# Patient Record
Sex: FEMALE | Race: WHITE | NOT HISPANIC OR LATINO | Employment: FULL TIME | URBAN - METROPOLITAN AREA
[De-identification: names, ages, dates, MRNs, and addresses within clinical notes are randomized per-mention and may not be internally consistent; named-entity substitution may affect disease eponyms.]

---

## 2017-10-11 ENCOUNTER — HISTORICAL (OUTPATIENT)
Dept: ADMINISTRATIVE | Facility: HOSPITAL | Age: 37
End: 2017-10-11

## 2017-10-13 LAB — FINAL CULTURE: NO GROWTH

## 2017-10-19 ENCOUNTER — HISTORICAL (OUTPATIENT)
Dept: ADMINISTRATIVE | Facility: HOSPITAL | Age: 37
End: 2017-10-19

## 2018-01-02 ENCOUNTER — HISTORICAL (OUTPATIENT)
Dept: ADMINISTRATIVE | Facility: HOSPITAL | Age: 38
End: 2018-01-02

## 2018-02-20 ENCOUNTER — HISTORICAL (OUTPATIENT)
Dept: ADMINISTRATIVE | Facility: HOSPITAL | Age: 38
End: 2018-02-20

## 2018-03-01 ENCOUNTER — HISTORICAL (OUTPATIENT)
Dept: ADMINISTRATIVE | Facility: HOSPITAL | Age: 38
End: 2018-03-01

## 2018-03-27 ENCOUNTER — HISTORICAL (OUTPATIENT)
Dept: LAB | Facility: HOSPITAL | Age: 38
End: 2018-03-27

## 2018-03-29 LAB — FINAL CULTURE: NORMAL

## 2018-04-10 ENCOUNTER — HISTORICAL (OUTPATIENT)
Dept: ADMINISTRATIVE | Facility: HOSPITAL | Age: 38
End: 2018-04-10

## 2018-08-22 ENCOUNTER — HISTORICAL (OUTPATIENT)
Dept: ADMINISTRATIVE | Facility: HOSPITAL | Age: 38
End: 2018-08-22

## 2018-08-22 LAB — TSH SERPL-ACNC: 0.01 MIU/ML (ref 0.35–4.94)

## 2018-10-15 ENCOUNTER — HISTORICAL (OUTPATIENT)
Dept: ADMINISTRATIVE | Facility: HOSPITAL | Age: 38
End: 2018-10-15

## 2018-10-15 LAB — TSH SERPL-ACNC: 1.12 MIU/ML (ref 0.35–4.94)

## 2019-10-23 ENCOUNTER — HISTORICAL (OUTPATIENT)
Dept: ADMINISTRATIVE | Facility: HOSPITAL | Age: 39
End: 2019-10-23

## 2019-10-23 LAB
ABS NEUT (OLG): 8 X10(3)/MCL (ref 2.1–9.2)
ALBUMIN SERPL-MCNC: 4.5 GM/DL (ref 3.4–5)
ALBUMIN/GLOB SERPL: 1.61 {RATIO} (ref 1.5–2.5)
ALP SERPL-CCNC: 101 UNIT/L (ref 38–126)
ALT SERPL-CCNC: 10 UNIT/L (ref 7–52)
AST SERPL-CCNC: 12 UNIT/L (ref 15–37)
BILIRUB SERPL-MCNC: 0.6 MG/DL (ref 0.2–1)
BILIRUBIN DIRECT+TOT PNL SERPL-MCNC: 0.1 MG/DL (ref 0–0.5)
BILIRUBIN DIRECT+TOT PNL SERPL-MCNC: 0.5 MG/DL
BUN SERPL-MCNC: 15 MG/DL (ref 7–18)
CALCIUM SERPL-MCNC: 9.6 MG/DL (ref 8.5–10)
CHLORIDE SERPL-SCNC: 103 MMOL/L (ref 98–107)
CHOLEST SERPL-MCNC: 210 MG/DL (ref 0–200)
CHOLEST/HDLC SERPL: 4.4 {RATIO}
CO2 SERPL-SCNC: 28 MMOL/L (ref 21–32)
CREAT SERPL-MCNC: 0.77 MG/DL (ref 0.6–1.3)
ERYTHROCYTE [DISTWIDTH] IN BLOOD BY AUTOMATED COUNT: 11.5 % (ref 11.5–17)
GLOBULIN SER-MCNC: 2.8 GM/DL (ref 1.2–3)
GLUCOSE SERPL-MCNC: 112 MG/DL (ref 74–106)
HCT VFR BLD AUTO: 42.5 % (ref 37–47)
HDLC SERPL-MCNC: 48 MG/DL (ref 35–60)
HGB BLD-MCNC: 14.4 GM/DL (ref 12–16)
LDLC SERPL CALC-MCNC: 135 MG/DL (ref 0–129)
LYMPHOCYTES # BLD AUTO: 2.4 X10(3)/MCL (ref 0.6–3.4)
LYMPHOCYTES NFR BLD AUTO: 22.5 % (ref 13–40)
MCH RBC QN AUTO: 30.1 PG (ref 27–31.2)
MCHC RBC AUTO-ENTMCNC: 34 GM/DL (ref 32–36)
MCV RBC AUTO: 89 FL (ref 80–94)
MONOCYTES # BLD AUTO: 0.4 X10(3)/MCL (ref 0.1–1.3)
MONOCYTES NFR BLD AUTO: 4 % (ref 0.1–24)
NEUTROPHILS NFR BLD AUTO: 73.5 % (ref 47–80)
PLATELET # BLD AUTO: 322 X10(3)/MCL (ref 130–400)
PMV BLD AUTO: 10.2 FL (ref 9.4–12.4)
POTASSIUM SERPL-SCNC: 4.7 MMOL/L (ref 3.5–5.1)
PROT SERPL-MCNC: 7.3 GM/DL (ref 6.4–8.2)
RBC # BLD AUTO: 4.78 X10(6)/MCL (ref 4.2–5.4)
SODIUM SERPL-SCNC: 137 MMOL/L (ref 136–145)
T3FREE SERPL-MCNC: 2 PG/ML (ref 1.45–3.48)
T4 FREE SERPL-MCNC: 0.96 NG/DL (ref 0.76–1.46)
TRIGL SERPL-MCNC: 167 MG/DL (ref 30–150)
TSH SERPL-ACNC: 3.51 MIU/ML (ref 0.35–4.94)
VLDLC SERPL CALC-MCNC: 33.4 MG/DL
WBC # SPEC AUTO: 10.8 X10(3)/MCL (ref 4.5–11.5)

## 2020-10-27 ENCOUNTER — HISTORICAL (OUTPATIENT)
Dept: ADMINISTRATIVE | Facility: HOSPITAL | Age: 40
End: 2020-10-27

## 2020-10-27 LAB
ABS NEUT (OLG): 7.5 X10(3)/MCL (ref 2.1–9.2)
ALBUMIN SERPL-MCNC: 4.3 GM/DL (ref 3.4–5)
ALBUMIN/GLOB SERPL: 1.95 {RATIO} (ref 1.5–2.5)
ALP SERPL-CCNC: 106 UNIT/L (ref 38–126)
ALT SERPL-CCNC: 9 UNIT/L (ref 7–52)
AST SERPL-CCNC: 12 UNIT/L (ref 15–37)
BILIRUB SERPL-MCNC: 0.6 MG/DL (ref 0.2–1)
BILIRUBIN DIRECT+TOT PNL SERPL-MCNC: 0.1 MG/DL (ref 0–0.5)
BILIRUBIN DIRECT+TOT PNL SERPL-MCNC: 0.5 MG/DL
BUN SERPL-MCNC: 15 MG/DL (ref 7–18)
CALCIUM SERPL-MCNC: 9.5 MG/DL (ref 8.5–10)
CHLORIDE SERPL-SCNC: 102 MMOL/L (ref 98–107)
CHOLEST SERPL-MCNC: 165 MG/DL (ref 0–200)
CHOLEST/HDLC SERPL: 4.1 {RATIO}
CO2 SERPL-SCNC: 28 MMOL/L (ref 21–32)
CREAT SERPL-MCNC: 0.85 MG/DL (ref 0.6–1.3)
ERYTHROCYTE [DISTWIDTH] IN BLOOD BY AUTOMATED COUNT: 11.8 % (ref 11.5–17)
GLOBULIN SER-MCNC: 2.2 GM/DL (ref 1.2–3)
GLUCOSE SERPL-MCNC: 95 MG/DL (ref 74–106)
HCT VFR BLD AUTO: 43.7 % (ref 37–47)
HDLC SERPL-MCNC: 40 MG/DL (ref 35–60)
HGB BLD-MCNC: 14.5 GM/DL (ref 12–16)
LDLC SERPL CALC-MCNC: 112 MG/DL (ref 0–129)
LYMPHOCYTES # BLD AUTO: 2.4 X10(3)/MCL (ref 0.6–3.4)
LYMPHOCYTES NFR BLD AUTO: 22.8 % (ref 13–40)
MCH RBC QN AUTO: 29.6 PG (ref 27–31.2)
MCHC RBC AUTO-ENTMCNC: 33 GM/DL (ref 32–36)
MCV RBC AUTO: 89 FL (ref 80–94)
MONOCYTES # BLD AUTO: 0.6 X10(3)/MCL (ref 0.1–1.3)
MONOCYTES NFR BLD AUTO: 5.9 % (ref 0.1–24)
NEUTROPHILS NFR BLD AUTO: 71.3 % (ref 47–80)
PLATELET # BLD AUTO: 283 X10(3)/MCL (ref 130–400)
PMV BLD AUTO: 10.7 FL (ref 9.4–12.4)
POTASSIUM SERPL-SCNC: 4.4 MMOL/L (ref 3.5–5.1)
PROT SERPL-MCNC: 6.5 GM/DL (ref 6.4–8.2)
RBC # BLD AUTO: 4.9 X10(6)/MCL (ref 4.2–5.4)
SODIUM SERPL-SCNC: 136 MMOL/L (ref 136–145)
T3FREE SERPL-MCNC: 2.89 PG/ML (ref 1.45–3.48)
T4 FREE SERPL-MCNC: 0.94 NG/DL (ref 0.76–1.46)
TRIGL SERPL-MCNC: 266 MG/DL (ref 30–150)
TSH SERPL-ACNC: 6.3 MIU/ML (ref 0.35–4.94)
VLDLC SERPL CALC-MCNC: 53.2 MG/DL
WBC # SPEC AUTO: 10.5 X10(3)/MCL (ref 4.5–11.5)

## 2021-11-29 ENCOUNTER — HISTORICAL (OUTPATIENT)
Dept: ADMINISTRATIVE | Facility: HOSPITAL | Age: 41
End: 2021-11-29

## 2021-11-29 LAB
ABS NEUT (OLG): 5.3 X10(3)/MCL (ref 2.1–9.2)
ALBUMIN SERPL-MCNC: 4.4 GM/DL (ref 3.4–5)
ALBUMIN/GLOB SERPL: 1.76 {RATIO} (ref 1.5–2.5)
ALP SERPL-CCNC: 95 UNIT/L (ref 38–126)
ALT SERPL-CCNC: 14 UNIT/L (ref 7–52)
APPEARANCE, UA: CLEAR
AST SERPL-CCNC: 15 UNIT/L (ref 15–37)
BACTERIA #/AREA URNS AUTO: NORMAL /HPF
BILIRUB SERPL-MCNC: 0.5 MG/DL (ref 0.2–1)
BILIRUB UR QL STRIP: NEGATIVE MG/DL
BILIRUBIN DIRECT+TOT PNL SERPL-MCNC: 0.1 MG/DL (ref 0–0.5)
BILIRUBIN DIRECT+TOT PNL SERPL-MCNC: 0.4 MG/DL
BUN SERPL-MCNC: 14 MG/DL (ref 7–18)
CALCIUM SERPL-MCNC: 9.4 MG/DL (ref 8.5–10)
CHLORIDE SERPL-SCNC: 104 MMOL/L (ref 98–107)
CHOLEST SERPL-MCNC: 188 MG/DL (ref 0–200)
CHOLEST/HDLC SERPL: 4.6 {RATIO}
CO2 SERPL-SCNC: 28 MMOL/L (ref 21–32)
COLOR UR: YELLOW
CREAT SERPL-MCNC: 0.78 MG/DL (ref 0.6–1.3)
ERYTHROCYTE [DISTWIDTH] IN BLOOD BY AUTOMATED COUNT: 11.6 % (ref 11.5–17)
GLOBULIN SER-MCNC: 2.5 GM/DL (ref 1.2–3)
GLUCOSE (UA): NEGATIVE MG/DL
GLUCOSE SERPL-MCNC: 106 MG/DL (ref 74–106)
HCT VFR BLD AUTO: 42.2 % (ref 37–47)
HDLC SERPL-MCNC: 41 MG/DL (ref 35–60)
HGB BLD-MCNC: 13.5 GM/DL (ref 12–16)
HGB UR QL STRIP: NEGATIVE UNIT/L
KETONES UR QL STRIP: NEGATIVE MG/DL
LDLC SERPL CALC-MCNC: 117 MG/DL (ref 0–129)
LEUKOCYTE ESTERASE UR QL STRIP: NEGATIVE UNIT/L
LYMPHOCYTES # BLD AUTO: 2.5 X10(3)/MCL (ref 0.6–3.4)
LYMPHOCYTES NFR BLD AUTO: 30.6 % (ref 13–40)
MCH RBC QN AUTO: 28.4 PG (ref 27–31.2)
MCHC RBC AUTO-ENTMCNC: 32 GM/DL (ref 32–36)
MCV RBC AUTO: 89 FL (ref 80–94)
MONOCYTES # BLD AUTO: 0.4 X10(3)/MCL (ref 0.1–1.3)
MONOCYTES NFR BLD AUTO: 5.1 % (ref 0.1–24)
NEUTROPHILS NFR BLD AUTO: 64.3 % (ref 47–80)
NITRITE UR QL STRIP.AUTO: NEGATIVE
PH UR STRIP: 7 [PH]
PLATELET # BLD AUTO: 249 X10(3)/MCL (ref 130–400)
PMV BLD AUTO: 11 FL (ref 9.4–12.4)
POTASSIUM SERPL-SCNC: 4.2 MMOL/L (ref 3.5–5.1)
PROT SERPL-MCNC: 6.9 GM/DL (ref 6.4–8.2)
PROT UR QL STRIP: NEGATIVE MG/DL
RBC # BLD AUTO: 4.76 X10(6)/MCL (ref 4.2–5.4)
RBC #/AREA URNS HPF: NORMAL /HPF
SODIUM SERPL-SCNC: 140 MMOL/L (ref 136–145)
SP GR UR STRIP: 1.02
SQUAMOUS EPITHELIAL, UA: NORMAL /LPF
T3FREE SERPL-MCNC: 3.02 PG/ML (ref 1.45–3.48)
T4 FREE SERPL-MCNC: 0.97 NG/DL (ref 0.76–1.46)
TRIGL SERPL-MCNC: 224 MG/DL (ref 30–150)
TSH SERPL-ACNC: 6.22 MIU/ML (ref 0.35–4.94)
UROBILINOGEN UR STRIP-ACNC: 0.2 MG/DL
VLDLC SERPL CALC-MCNC: 44.8 MG/DL
WBC # SPEC AUTO: 8.2 X10(3)/MCL (ref 4.5–11.5)
WBC #/AREA URNS AUTO: NORMAL /[HPF]

## 2021-12-01 ENCOUNTER — HISTORICAL (OUTPATIENT)
Dept: ADMINISTRATIVE | Facility: HOSPITAL | Age: 41
End: 2021-12-01

## 2021-12-01 LAB
EST. AVERAGE GLUCOSE BLD GHB EST-MCNC: 108 MG/DL
HBA1C MFR BLD: 5.4 % (ref 4.4–6.4)

## 2022-04-09 ENCOUNTER — HISTORICAL (OUTPATIENT)
Dept: ADMINISTRATIVE | Facility: HOSPITAL | Age: 42
End: 2022-04-09

## 2022-04-26 VITALS
DIASTOLIC BLOOD PRESSURE: 78 MMHG | OXYGEN SATURATION: 100 % | SYSTOLIC BLOOD PRESSURE: 130 MMHG | WEIGHT: 170 LBS | BODY MASS INDEX: 28.32 KG/M2 | HEIGHT: 65 IN
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
## 2022-05-02 NOTE — HISTORICAL [facility]
This is a historical note converted from Kim. Formatting and pictures may have been removed.  Please reference Kim for original formatting and attached multimedia. Chief Complaint  WELLNESS CPX FAST  History of Present Illness  Patient presents today for a wellness exam.? She also has a history of hypothyroidism, she is seen Dr. Simmons endocrinology in the past?but is following up with us at this time.  No labs in the last year with Dr. Simmons or here.  Patient states she has been taking the levothyroxine 88 mcg approximately every other day, because she feels the daily dosing?gives her symptoms of hyperthyroidism.? She would not be opposed to lowering the dose?based on laboratory analysis.  Review of Systems  Constitutional:?No fever, no weakness, no weight loss, no fatigue  Eye: ? ? ? ? ? ? ? ???No eye redness, drainage, or pain  ENMT:??? ? ? ? ? ? No sore?throat pain, postnasal drainage, ?or mucus production  Respiratory:????No wheezing,?no shortness of breath  Cardiovascular:??No chest pain, no TILLMAN  Gastrointestinal:?No nausea, vomiting, or diarrhea. No abdominal pain, no hematochezia or melena  Musculoskeletal:?No muscle or joint pain, no joint swelling  Integumentary:??? No skin rash or abnormal lesion  Neuro:??No headaches, dizziness, or weakness  Psych:?No anxiety, depression, or SI/HI  Endo:??No polyuria, polydipsia, or polyphagia  Heme/Lymph:??No bruising or lymphadenopathy  Physical Exam  Vitals & Measurements  T:?37? ?C (Oral)? HR:?60(Peripheral)? BP:?104/68?  HT:?165?cm? WT:?75.2?kg? BMI:?27.62? LMP:?10/21/2019 00:00 CDT?  General: ???? ? ? ? ? Well developed, well-nourished, in no acute distress  Eye: ? ? ? ? ? ? ? ? ? ??Clear conjunctiva, eyelids normal  HENT:??? ? ? ? ? ? ? ? No erythema, No exudate or swelling, TMs clear  Neck:??? ? ? ? ? ? ? ? ?Full range of motion, No cervical lymphadenopathy  Respiratory:??? ? ?Clear to auscultation bilaterally  Cardiovascular:?Regular rate and rhythm without  murmurs, gallops or rubs  Abdomen: ? ? ? ? ?Soft, NT, ND, NABS x4, no HSM  Musculoskeletal:?No tenderness, joints WNL, FROM, ?no CCE  Neuro: ? ? ? ? ? ? ? ? ?No motor/sensory deficits, Reflexes 2+ throughout, CN II-XII intact  Integumentary: ??No rashes or skin lesions present  LN:?WNL  Assessment/Plan  1.?Wellness examination?Z00.00  ?Normal physical exam  Ordered:  CBC w/ Auto Diff, Routine collect, 10/23/19 10:18:00 CDT, Blood, Order for future visit, Stop date 10/23/19 10:18:00 CDT, Lab Collect, Wellness examination, 10/23/19 10:18:00 CDT  Comprehensive Metabolic Panel, Routine collect, 10/23/19 10:18:00 CDT, Blood, Order for future visit, Stop date 10/23/19 10:18:00 CDT, Lab Collect, Wellness examination, 10/23/19 10:18:00 CDT  Lipid Panel, Now collect, 10/23/19 10:18:00 CDT, Blood, Order for future visit, Stop date 10/23/19 10:18:00 CDT, Lab Collect, Wellness examination, 10/23/19 10:18:00 CDT  ?  2.?Hashimotos thyroiditis?E06.3  Patient has been taking 88 mcg of levothyroxine, approximately every other day.? I adjusted based on how I feel if I am running hyper?  We discussed that although?sometimes alternating days?or using?different dosings?is commonly required?with hypothyroidism it is best if we can find?a dose that she can take daily for convenience sake, and accuracy?during laboratory analysis.? She agrees?and pending those results?we have discussed possibly decreasing to 50 mcg.  Ordered:  Free T4, Routine collect, 10/23/19 10:18:00 CDT, Blood, Order for future visit, Stop date 10/23/19 10:18:00 CDT, Lab Collect, Hashimotos thyroiditis, 10/23/19 10:18:00 CDT  Free T4, Routine collect, *Est. 12/04/19 3:00:00 CST, Blood, Order for future visit, *Est. Stop date 12/04/19 3:00:00 CST, Lab Collect, Hashimotos thyroiditis, 10/23/19 10:32:00 CDT  T3 Free, Routine collect, 10/23/19 10:18:00 CDT, Blood, Order for future visit, Stop date 10/23/19 10:18:00 CDT, Lab Collect, Hashimotos thyroiditis, 10/23/19 10:18:00  CDT  T3 Free, Routine collect, *Est. 12/04/19 3:00:00 CST, Blood, Order for future visit, *Est. Stop date 12/04/19 3:00:00 CST, Lab Collect, Hashimotos thyroiditis, 10/23/19 10:32:00 CDT  Thyroid Stimulating Hormone, Routine collect, 10/23/19 10:18:00 CDT, Blood, Order for future visit, Stop date 10/23/19 10:18:00 CDT, Lab Collect, Hashimotos thyroiditis, 10/23/19 10:18:00 CDT  Thyroid Stimulating Hormone, Routine collect, *Est. 12/04/19 3:00:00 CST, Blood, Order for future visit, *Est. Stop date 12/04/19 3:00:00 CST, Lab Collect, Hashimotos thyroiditis, 10/23/19 10:32:00 CDT  ?  Orders:  Lab Collection Request, 10/23/19 10:18:00 CDT, [facility] - [facility], 10/23/19 10:18:00 CDT   Problem List/Past Medical History  Ongoing  History of palpitations  Pregnancy induced hypertension  Psoriasis  Historical  Hypothyroidism  Pregnant  Pregnant  Procedure/Surgical History  Delivery of Products of Conception, External Approach (04/30/2018)  Episiotomy (05/20/2015)  Medical induction of labor (05/20/2015)  Other manually assisted delivery (05/20/2015)  Extraction of impacted wisdom tooth  T&A   Medications  Calcium, Magnesium and Zinc oral tablet, 1 tab(s), Oral, Daily  levothyroxine 88 mcg (0.088 mg) oral tablet, 88 mcg= 1 tab(s), Oral, Daily  Zantac 150, 150 mg, Oral, BID,? ?Not taking  Allergies  sulfa drugs  Social History  Abuse/Neglect  No, 10/23/2019  Alcohol - Denies Alcohol Use, 05/20/2015  Current, 1-2 times per month, 07/23/2018  Employment/School  Employed, 07/23/2018  Home/Environment  Lives with Children, Spouse., 07/23/2018  Nutrition/Health  Regular, 07/23/2018  Substance Use - Denies Substance Abuse, 05/20/2015  Never, 04/30/2018  Tobacco - Denies Tobacco Use, 05/20/2015  Never (less than 100 in lifetime), N/A, 10/23/2019  Family History  Congestive heart disease.: Mother.  Diabetes mellitus type 2: Mother, Father and Grandmother.  Hypertension.: Father, Grandfather and Grandmother.  Primary malignant neoplasm of  prostate: Father.  Immunizations  Vaccine Date Status   influenza virus vaccine, inactivated 10/02/2019 Recorded   tetanus/diphtheria/pertussis, acel(Tdap) 05/01/2018 Given   influenza virus vaccine, inactivated 11/29/2017 Recorded   tetanus/diphtheria/pertussis, acel(Tdap) 05/21/2015 Given   hepatitis B pediatric vaccine 01/23/2002 Recorded   hepatitis B pediatric vaccine 09/17/2001 Recorded   meningococcal polysaccharide vaccine 08/27/2001 Recorded   hepatitis B pediatric vaccine 08/20/2001 Recorded   Health Maintenance  Health Maintenance  ???Pending?(in the next year)  ??? ??OverDue  ??? ? ? ?Alcohol Misuse Screening due??01/01/19??and every 1??year(s)  ??? ??Due?  ??? ? ? ?ADL Screening due??10/23/19??and every 1??year(s)  ??? ??Due In Future?  ??? ? ? ?Obesity Screening not due until??01/01/20??and every 1??year(s)  ???Satisfied?(in the past 1 year)  ??? ??Satisfied?  ??? ? ? ?Obesity Screening on??10/23/19.??Satisfied by Amanda Artis LPN  ?

## 2022-05-02 NOTE — HISTORICAL [facility]
This is a historical note converted from Kim. Formatting and pictures may have been removed.  Please reference Kim for original formatting and attached multimedia. Chief Complaint  Wellness visit  History of Present Illness  41 yo CF presents here today for a well adult exam. Pt. denies significant weight loss or weight gain, headache, hearing loss or vision changes, SOB or chest pain.  ?   Pt. has a hx. of Hashimotos. ?She notes she has a history of extreme?hyper thyroid episodes?as well as?extreme lows. ?Patient has been taking 25 mcg daily. ?She is prescribed 50 mcg, but notes that?when she would start the 50 mcg that she will have heart palpitations. ?She knows that her thyroid is?off?because she is having?on?symptoms like right?burning?foot pain/neuropathy,?spotting?after her?cycle has ended, and?excessive fatigue. ?She has never been on a beta-blocker?prior. ?Patient is without any other?complaints or concerns?today.  ?   PMhx:?Palpitations, psoriasis, hypothyroidism  General health:?Good  Diet:?Regular  Exercise:?Nothing routine  ?  Caffeine: one coffee or coke a day  Etoh:?1-2 times a month  Tobacco:?Never?  ?  Specialists: GYN, Endo-Maria R, Derm--Varun  ?  Labs:?Reviewed with patient today:?Liver and kidney function normal, cholesterol ratio 4.6.? Triglycerides have improved from 2 66-2 44.? LDL about the same as last year--117 today.? TSH is elevated, but free T4 and free T3 are normal.? No anemia.  Immunizations: UTD?with Tdap; refuses Covid and flu?immunizations  Colon Ca Screening: Colonoscopy due at 47  Breast Ca Screening: MMG due at 42; no family hx.  Cervical Ca Screening:?Pap smear up-to-date this year  Bone Density: due at later date  LMP:?11/23/2021; spotting  Dental/Vision:?Both up-to-date  Review of Systems  Constitutional:?No weight gain, No fever, No chills,?+ fatigue.?  Eyes:?No blurring, No visual disturbances.?  Ear/Nose/Mouth/Throat:?No decreased hearing, No ear pain, No nasal  congestion, No sore throat.?  Respiratory:?No shortness of breath, No cough, No wheezing.?  Cardiovascular:?No chest pain,?+ occ.?palpitations, No peripheral edema.?  Gastrointestinal:?No nausea, No vomiting, No diarrhea, No constipation, No abdominal pain.??No black or bloody stools.  Genitourinary:?No dysuria, No hematuria.?+urinates?a lot--weak bladder + vaginal spotting  Hematology/Lymphatics:?No bruising tendency, No bleeding tendency, No swollen lymph glands.?  Endocrine:?No excessive thirst,? No excessive hunger.?  Musculoskeletal:?No decreased range of motion.?  Integumentary:?No pruritus.??+?Psoriasis  Neurologic:?No abnormal balance, No confusion, No headache.?  Psychiatric:?No anxiety, No depression, Not suicidal, No hallucinations. ?  Physical Exam  Vitals & Measurements  HR:?80(Peripheral)? BP:?130/78? SpO2:?100%?  HT:?165?cm? WT:?77.1?kg? WT:?77.100?kg? LMP:?11/23/2021 00:00 CST?  General:?Alert and oriented, No acute distress.?  Eye:?Normal conjunctiva without exudate.  HENMT:?Normocephalic/AT, Normal hearing, Oral mucosa is moist and pink. ?Good dentition.??No nasal exudate.  Neck:?No goiter visualized.?  Respiratory:?Lungs CTAB, Respirations are non-labored, Breath sounds are equal, Symmetrical chest wall expansion.  Cardiovascular:?Normal rate, Regular rhythm, No murmur, No edema.?  Gastrointestinal:?Non-distended.??Soft, nontender to palpation. ?No mass or hernia.  Genitourinary:?No CVA tenderness.  Musculoskeletal:?Normal ROM, Normal gait, No deformities or amputations.  Integumentary:?Warm, Dry, Intact. No diaphoresis, or flushing.? + Psoriatic scarring?on?foot  Neurologic:?No focal deficits, Cranial Nerves II-XII are grossly intact.?  Psychiatric:?Cooperative, Appropriate mood & affect, Normal judgment, Non-suicidal.  Assessment/Plan  1.?Wellness examination?Z00.00  Diet discussed (healthy food choices, reduce portions and overall calorie intake)  Exercise 30-45 minutes 3x per week  Avoid  excessive alcohol and tobacco  Immunizations discussed?  Monthly self breast exams discussed  Preventative exams discussed  Labs reviewed in depth with pt.  added A1C to blood work since patient had IFG  Ordered:  Clinic Follow-Up Wellness, *Est. 12/01/22 3:00:00 CST, Order for future visit, Wellness examination, ink [facility]  Preventative Health Care Est 18-39 years [id number] PC, Wellness examination, INK AMB - [facility], 12/01/21 14:12:00 CST  ?  2.?Hypothyroidism?E03.9  pt. has been taking 25 mcg  increase to 50 mcg  Rx. propranolol  repeat in free T4 and TSH in 4-6 weeks  Super B complex due to neuropathy in right foot  +/- pelvic US for AUB  ?  3.?Psoriasis?L40.9  ?keep follow ups with derm.  ?  Education and counseling done face to face regarding medical conditions, current and new medications including risk/benefit and side effects/adverse events, over the counter medications-uses/doses, home self-care and red flags and indications for immediate medical attention. Call if symptoms change or new ones develop. Should any symptoms ever significantly worsen, go to ER. Follow up as scheduled in 6 mos. and yearly for wellness or sooner PRN. Will call with any results. The patient is receptive, expresses understanding and is agreeable to plan. All questions have been answered.?  Referrals  Clinic Follow up, Order for future visit  Clinic Follow up, *Est. 06/01/22 3:00:00 CDT, Order for future visit, Hypothyroidism, [facility]  Clinic Follow-Up Wellness, *Est. 12/01/22 3:00:00 CST, Order for future visit, Wellness examination, [facility]   Problem List/Past Medical History  Ongoing  History of palpitations  Hypothyroidism  Psoriasis  Historical  Pregnancy induced hypertension  Procedure/Surgical History  Delivery of Products of Conception, External Approach (04/30/2018)  Episiotomy (05/20/2015)  Medical induction of labor (05/20/2015)  Other manually assisted delivery (05/20/2015)  Extraction of impacted wisdom tooth  T&A    Medications  levothyroxine 50 mcg (0.05 mg) oral tablet, 50 mcg= 1 tab(s), Oral, Daily, 1 refills  Allergies  sulfa drugs  Social History  Abuse/Neglect  No, 12/01/2021  No, 10/27/2020  Alcohol - Denies Alcohol Use, 05/20/2015  Current, 1-2 times per month, 07/23/2018  Employment/School  Employed, 07/23/2018  Home/Environment  Lives with Children, Spouse., 07/23/2018  Nutrition/Health  Regular, 07/23/2018  Substance Use - Denies Substance Abuse, 05/20/2015  Never, 04/30/2018  Tobacco - Denies Tobacco Use, 05/20/2015  Never (less than 100 in lifetime), No, 12/01/2021  Never (less than 100 in lifetime), N/A, 10/27/2020  Family History  Congestive heart disease.: Mother.  Diabetes mellitus type 2: Mother, Father and Grandmother.  Hypertension.: Father, Grandfather and Grandmother.  Primary malignant neoplasm of prostate: Father.  Immunizations  Vaccine Date Status   influenza virus vaccine, inactivated 10/02/2019 Recorded   tetanus/diphtheria/pertussis, acel(Tdap) 05/01/2018 Given   influenza virus vaccine, inactivated 11/29/2017 Recorded   tetanus/diphtheria/pertussis, acel(Tdap) 05/21/2015 Given   hepatitis B pediatric vaccine 01/23/2002 Recorded   hepatitis B pediatric vaccine 09/17/2001 Recorded   meningococcal polysaccharide vaccine 08/27/2001 Recorded   hepatitis B pediatric vaccine 08/20/2001 Recorded   Health Maintenance  Health Maintenance  ???Pending?(in the next year)  ??? ??OverDue  ??? ? ? ?Alcohol Misuse Screening due??01/02/21??and every 1??year(s)  ??? ??Due?  ??? ? ? ?Depression Screening due??10/27/21??and every 1??year(s)  ??? ? ? ?ADL Screening due??12/01/21??and every 1??year(s)  ??? ??Due In Future?  ??? ? ? ?Obesity Screening not due until??01/01/22??and every 1??year(s)  ???Satisfied?(in the past 1 year)  ??? ??Satisfied?  ??? ? ? ?Blood Pressure Screening on??12/01/21.??Satisfied by Ann Presley CMA  ??? ? ? ?Cervical Cancer Screening on??10/14/21.??Satisfied by Amalia Zacarias  ??? ? ?  ?Diabetes Screening on??11/29/21.??Satisfied by Teresita Fong  ??? ? ? ?Influenza Vaccine on??12/01/21.??Satisfied by Ann Presley CMA  ??? ? ? ?Obesity Screening on??12/01/21.??Satisfied by Ann Presley CMA  ?      Patient condition discussed?in detail with nurse practitioner.? Agree with plan of care?and follow-up.

## 2022-12-05 PROCEDURE — 85651 RBC SED RATE NONAUTOMATED: CPT | Performed by: FAMILY MEDICINE

## 2022-12-13 PROBLEM — E03.9 HYPOTHYROIDISM: Status: ACTIVE | Noted: 2022-12-13

## 2022-12-13 PROBLEM — L40.9 PSORIASIS: Status: ACTIVE | Noted: 2022-12-13

## 2022-12-13 PROCEDURE — 87088 URINE BACTERIA CULTURE: CPT | Performed by: NURSE PRACTITIONER

## 2024-02-06 PROBLEM — E03.9 HYPOTHYROIDISM: Chronic | Status: ACTIVE | Noted: 2022-12-13

## 2024-02-06 PROBLEM — Z00.00 WELLNESS EXAMINATION: Status: ACTIVE | Noted: 2024-02-06

## 2024-02-06 PROBLEM — E78.2 MODERATE MIXED HYPERLIPIDEMIA NOT REQUIRING STATIN THERAPY: Chronic | Status: ACTIVE | Noted: 2024-02-06

## 2024-02-06 PROBLEM — E78.2 MODERATE MIXED HYPERLIPIDEMIA NOT REQUIRING STATIN THERAPY: Status: ACTIVE | Noted: 2024-02-06

## 2024-02-06 PROBLEM — H04.123 DRY EYE SYNDROME OF BOTH EYES: Chronic | Status: ACTIVE | Noted: 2024-02-06

## 2024-02-06 PROBLEM — L40.9 PSORIASIS: Status: RESOLVED | Noted: 2022-12-13 | Resolved: 2024-02-06

## 2024-02-06 PROBLEM — H04.123 DRY EYE SYNDROME OF BOTH EYES: Status: ACTIVE | Noted: 2024-02-06

## 2024-02-06 PROCEDURE — 86038 ANTINUCLEAR ANTIBODIES: CPT | Mod: 91 | Performed by: STUDENT IN AN ORGANIZED HEALTH CARE EDUCATION/TRAINING PROGRAM

## 2024-02-06 PROCEDURE — 85652 RBC SED RATE AUTOMATED: CPT | Performed by: STUDENT IN AN ORGANIZED HEALTH CARE EDUCATION/TRAINING PROGRAM

## 2024-02-06 PROCEDURE — 86235 NUCLEAR ANTIGEN ANTIBODY: CPT | Performed by: STUDENT IN AN ORGANIZED HEALTH CARE EDUCATION/TRAINING PROGRAM

## 2024-02-06 PROCEDURE — 86200 CCP ANTIBODY: CPT | Performed by: STUDENT IN AN ORGANIZED HEALTH CARE EDUCATION/TRAINING PROGRAM

## 2024-02-06 PROCEDURE — 86038 ANTINUCLEAR ANTIBODIES: CPT | Performed by: STUDENT IN AN ORGANIZED HEALTH CARE EDUCATION/TRAINING PROGRAM

## 2024-02-06 PROCEDURE — 86141 C-REACTIVE PROTEIN HS: CPT | Performed by: STUDENT IN AN ORGANIZED HEALTH CARE EDUCATION/TRAINING PROGRAM

## 2024-02-06 PROCEDURE — 86039 ANTINUCLEAR ANTIBODIES (ANA): CPT | Performed by: STUDENT IN AN ORGANIZED HEALTH CARE EDUCATION/TRAINING PROGRAM

## 2024-03-05 LAB — BCS RECOMMENDATION EXT: NORMAL

## 2024-05-13 PROBLEM — Z00.00 WELLNESS EXAMINATION: Status: RESOLVED | Noted: 2024-02-06 | Resolved: 2024-05-13

## 2025-02-19 ENCOUNTER — LAB VISIT (OUTPATIENT)
Dept: LAB | Facility: HOSPITAL | Age: 45
End: 2025-02-19
Attending: OBSTETRICS & GYNECOLOGY
Payer: COMMERCIAL

## 2025-02-19 DIAGNOSIS — E34.9 ENDOCRINE DISORDER RELATED TO PUBERTY: Primary | ICD-10-CM

## 2025-02-19 DIAGNOSIS — E55.9 AVITAMINOSIS D: ICD-10-CM

## 2025-02-19 LAB
25(OH)D3+25(OH)D2 SERPL-MCNC: 38 NG/ML (ref 30–80)
ESTRADIOL SERPL HS-MCNC: 142 PG/ML
PROGEST SERPL-MCNC: 11.5 NG/ML
T4 FREE SERPL-MCNC: 1.07 NG/DL (ref 0.7–1.48)
TESTOST SERPL-MCNC: 38.69 NG/DL (ref 13.84–53.35)
TSH SERPL-ACNC: 3.16 UIU/ML (ref 0.35–4.94)

## 2025-02-19 PROCEDURE — 84144 ASSAY OF PROGESTERONE: CPT

## 2025-02-19 PROCEDURE — 84439 ASSAY OF FREE THYROXINE: CPT

## 2025-02-19 PROCEDURE — 82306 VITAMIN D 25 HYDROXY: CPT

## 2025-02-19 PROCEDURE — 84403 ASSAY OF TOTAL TESTOSTERONE: CPT

## 2025-02-19 PROCEDURE — 36415 COLL VENOUS BLD VENIPUNCTURE: CPT

## 2025-02-19 PROCEDURE — 84443 ASSAY THYROID STIM HORMONE: CPT

## 2025-02-19 PROCEDURE — 82670 ASSAY OF TOTAL ESTRADIOL: CPT

## 2025-02-21 ENCOUNTER — DOCUMENTATION ONLY (OUTPATIENT)
Dept: PRIMARY CARE CLINIC | Facility: CLINIC | Age: 45
End: 2025-02-21
Payer: COMMERCIAL

## 2025-02-21 ENCOUNTER — TELEPHONE (OUTPATIENT)
Dept: PRIMARY CARE CLINIC | Facility: CLINIC | Age: 45
End: 2025-02-21
Payer: COMMERCIAL

## 2025-02-21 NOTE — TELEPHONE ENCOUNTER
----- Message from Zee sent at 2/21/2025  8:58 AM CST -----  Regarding: info  .Type:  Needs Medical AdviceWho Called: Mega Avila (please be specific):  How long has patient had these symptoms:  Pharmacy name and phone #:  Would the patient rather a call back or a response via [facility]? Best Call Back Number: Additional Information: pt info is in epic -Dr. Rush Last provider pt requested med rec fax to [place]

## 2025-02-24 ENCOUNTER — DOCUMENTATION ONLY (OUTPATIENT)
Dept: PRIMARY CARE CLINIC | Facility: CLINIC | Age: 45
End: 2025-02-24
Payer: COMMERCIAL

## 2025-03-10 PROBLEM — E78.5 DYSLIPIDEMIA: Chronic | Status: ACTIVE | Noted: 2025-03-10

## 2025-03-10 NOTE — PROGRESS NOTES
Subjective:       Patient ID: Jane Burger is a 44 y.o. female.    Chief Complaint: Establish Care      Patient presents to the clinic to establish primary care and for a wellness exam.  Current and past medical history reviewed.  Pertinent family and social history reviewed.    CRC screening:  CRC screening reviewed.  Cervical cancer screening:  If applicable, cervical cancer screening reviewed.  Breast cancer screening:  If applicable, breast cancer screening reviewed.    Vaccinations due:  Vaccination status reviewed, if due and if desired vaccinations provided in given.    Patient is without complaints today.    Patient does have some clinical issues, something that would seem to be clinical symptoms that would suggest some type of autoimmune phenomenon.  She has facial rash, migratory joint pain.  She has received a workup, she does have a diagnosis of Hashimoto's thyroiditis.  She did have some abnormal rheumatological testing, positive KAILEY, elevated sed rate.     Component  Ref Range & Units (hover) 1 yr ago  Rheumatoid Factor <10  Complement Component 3 196 High     Anti-Nuclear Ab (KAILEY), IgG by MIKAL Detected Abnormal     Component  Ref Range & Units (hover) 1 yr ago  KAILEY Pattern Homogeneous Abnormal   KAILEY Titer 1:640 Abnormal             Review of Systems   Constitutional: Negative.  Negative for fatigue and fever.   HENT: Negative.  Negative for sore throat and trouble swallowing.    Eyes: Negative.  Negative for redness and visual disturbance.   Respiratory: Negative.  Negative for chest tightness and shortness of breath.    Cardiovascular: Negative.  Negative for chest pain.   Gastrointestinal: Negative.  Negative for abdominal pain, blood in stool and nausea.   Endocrine: Negative.  Negative for cold intolerance, heat intolerance and polyuria.   Genitourinary: Negative.    Musculoskeletal: Negative.  Negative for arthralgias, gait problem and joint swelling.   Integumentary:  Negative for  "rash. Negative.   Neurological: Negative.  Negative for dizziness, weakness and headaches.   Psychiatric/Behavioral: Negative.  Negative for agitation and dysphoric mood.            Patient Care Team:  Darrell Franco MD as PCP - General (Family Medicine)  Arthur Poole MD as Consulting Physician (Obstetrics and Gynecology)  Objective:   Visit Vitals  /87   Pulse 99   Resp 18   Ht 5' 5" (1.651 m)   Wt 76.7 kg (169 lb)   LMP 02/28/2025 (Approximate)   SpO2 100%   BMI 28.12 kg/m²        Physical Exam  Constitutional:       Appearance: Normal appearance.   HENT:      Head: Normocephalic.      Mouth/Throat:      Mouth: Mucous membranes are moist.      Pharynx: Oropharynx is clear.   Eyes:      Conjunctiva/sclera: Conjunctivae normal.      Pupils: Pupils are equal, round, and reactive to light.   Cardiovascular:      Rate and Rhythm: Normal rate and regular rhythm.      Heart sounds: Normal heart sounds.   Pulmonary:      Effort: Pulmonary effort is normal.      Breath sounds: Normal breath sounds.   Abdominal:      General: Abdomen is flat.      Palpations: Abdomen is soft.   Musculoskeletal:         General: Normal range of motion.      Cervical back: Neck supple.   Skin:     General: Skin is warm and dry.   Neurological:      General: No focal deficit present.      Mental Status: She is alert and oriented to person, place, and time.   Psychiatric:         Mood and Affect: Mood normal.         Behavior: Behavior normal.         Thought Content: Thought content normal.         Judgment: Judgment normal.           Lab Results   Component Value Date     02/06/2024    K 4.3 02/06/2024     02/06/2024    CO2 27 02/06/2024    BUN 12.0 02/06/2024    CREATININE 0.73 02/06/2024    CALCIUM 9.8 02/06/2024    ALBUMIN 4.8 02/06/2024    BILITOT 0.8 02/06/2024    ALKPHOS 87 02/06/2024    AST 15 02/06/2024    ALT 12 02/06/2024    EGFRNORACEVR >60 02/06/2024     Lab Results   Component Value Date    WBC 11.40 " 02/06/2024    RBC 5.09 02/06/2024    HGB 14.6 02/06/2024    HCT 44.4 02/06/2024    MCV 87.2 02/06/2024    RDW 11.6 02/06/2024     02/06/2024      Lab Results   Component Value Date    CHOL 186 02/06/2024    TRIG 178 (H) 02/06/2024    HDL 52 02/06/2024    LDL 98.00 02/06/2024    TOTALCHOLEST 4 02/06/2024     Lab Results   Component Value Date    TSH 3.161 02/19/2025     Lab Results   Component Value Date    HGBA1C 5.5 02/06/2024       Assessment:       ICD-10-CM ICD-9-CM   1. Wellness examination  Z00.00 V70.0   2. Establishing care with new doctor, encounter for  Z76.89 V65.8   3. Acquired hypothyroidism  E03.9 244.9   4. Dyslipidemia  E78.5 272.4   5. Elevated antinuclear antibody (KAILEY) level  R76.8 795.79       Current Outpatient Medications   Medication Instructions    augmented betamethasone dipropionate (DIPROLENE-AF) 0.05 % cream 1 g, Topical (Top), 2 times daily PRN    calcium carb-D3-mag ox-zinc ox (AZRA MAG ZINC PLUS D3) 333 mg-133 unit -133 mg-5 mg Tab 1 tablet, Oral, Daily    cycloSPORINE (RESTASIS) 0.05 % ophthalmic emulsion 1 drop, Both Eyes, 2 times daily    diphenhydrAMINE (BENADRYL) 50 mg, Oral, Nightly PRN    levothyroxine (SYNTHROID) 75 MCG tablet TAKE ONE TABLET BY MOUTH IN THE MORNING before breakfast    predniSONE (DELTASONE) 5 mg, Oral, Daily     Plan:     Problem List Items Addressed This Visit          Cardiac/Vascular    Dyslipidemia (Chronic)    Overview   Dyslipidemia is being treated using lifestyle modification only.  Patient is not being prescribed lipid-lowering medication.  As discussed, we will continue to monitor this, and may ultimately choose to prescribe medication if this becomes difficult to control utilizing lifestyle modification only.         Relevant Orders    Lipid Panel    Lipid Panel       Immunology/Multi System    Elevated antinuclear antibody (KAILEY) level (Chronic)    Overview   While it is possible that this is secondary to her Hashimoto's, other causes can not  be completely ruled out.  After a lengthy discussion, it was mutually decided that we would try a low-dose prednisone.  Risk factors concerning long-term use of corticosteroids were discussed.  Would like to see her back in three months for continuation of this discussion, and possibly a change in treatment plans depending on the effectiveness of the prednisone.         Relevant Medications    predniSONE (DELTASONE) 5 MG tablet       Endocrine    Acquired hypothyroidism (Chronic)    Overview   Prescribed levothyroxine 75 mcg daily.  History of Hashimoto's.    Most recent TSH/thyroid function appears to be normal.    Continue thyroid supplementation medication at current dosage.         Relevant Orders    TSH    T4, Free     Other Visit Diagnoses         Wellness examination    -  Primary    Overall health status was reviewed.  Good health habits reinforced.  Annual wellness exams recommended.    Relevant Orders    Comprehensive Metabolic Panel    CBC Auto Differential    Hemoglobin A1C    Comprehensive Metabolic Panel    CBC Auto Differential    Hemoglobin A1C      Establishing care with new doctor, encounter for        Patient's medical care has been established in this clinic.  All issues addressed, all questions answered,  with appropriate scheduling of follow-up care.                  F/up June 2025, chronic condition/autoimmune discussion  Follow up in about 1 year (around 3/11/2026).    This note was created with the assistance of [employer] voice recognition software or phone dictation. There may be transcription errors as a result of using this technology. Effort has been made to assure accuracy of transcription but any obvious errors or omissions should be clarified with the author of the document.

## 2025-03-11 ENCOUNTER — OFFICE VISIT (OUTPATIENT)
Dept: PRIMARY CARE CLINIC | Facility: CLINIC | Age: 45
End: 2025-03-11
Payer: COMMERCIAL

## 2025-03-11 VITALS
BODY MASS INDEX: 28.16 KG/M2 | OXYGEN SATURATION: 100 % | DIASTOLIC BLOOD PRESSURE: 87 MMHG | RESPIRATION RATE: 18 BRPM | WEIGHT: 169 LBS | SYSTOLIC BLOOD PRESSURE: 138 MMHG | HEART RATE: 99 BPM | HEIGHT: 65 IN

## 2025-03-11 DIAGNOSIS — Z00.00 WELLNESS EXAMINATION: Primary | ICD-10-CM

## 2025-03-11 DIAGNOSIS — E78.5 DYSLIPIDEMIA: Chronic | ICD-10-CM

## 2025-03-11 DIAGNOSIS — E03.9 ACQUIRED HYPOTHYROIDISM: Chronic | ICD-10-CM

## 2025-03-11 DIAGNOSIS — Z76.89 ESTABLISHING CARE WITH NEW DOCTOR, ENCOUNTER FOR: ICD-10-CM

## 2025-03-11 DIAGNOSIS — R76.8 ELEVATED ANTINUCLEAR ANTIBODY (ANA) LEVEL: ICD-10-CM

## 2025-03-11 LAB
ALBUMIN SERPL-MCNC: 4 G/DL (ref 3.5–5)
ALBUMIN/GLOB SERPL: 1.4 RATIO (ref 1.1–2)
ALP SERPL-CCNC: 113 UNIT/L (ref 40–150)
ALT SERPL-CCNC: 19 UNIT/L (ref 0–55)
ANION GAP SERPL CALC-SCNC: 8 MEQ/L
AST SERPL-CCNC: 17 UNIT/L (ref 5–34)
BASOPHILS # BLD AUTO: 0.06 X10(3)/MCL
BASOPHILS NFR BLD AUTO: 0.4 %
BILIRUB SERPL-MCNC: 0.4 MG/DL
BUN SERPL-MCNC: 13.2 MG/DL (ref 7–18.7)
CALCIUM SERPL-MCNC: 9.6 MG/DL (ref 8.4–10.2)
CHLORIDE SERPL-SCNC: 107 MMOL/L (ref 98–107)
CHOLEST SERPL-MCNC: 182 MG/DL
CHOLEST/HDLC SERPL: 4 {RATIO} (ref 0–5)
CO2 SERPL-SCNC: 25 MMOL/L (ref 22–29)
CREAT SERPL-MCNC: 0.85 MG/DL (ref 0.55–1.02)
CREAT/UREA NIT SERPL: 16
EOSINOPHIL # BLD AUTO: 0.08 X10(3)/MCL (ref 0–0.9)
EOSINOPHIL NFR BLD AUTO: 0.6 %
ERYTHROCYTE [DISTWIDTH] IN BLOOD BY AUTOMATED COUNT: 12.6 % (ref 11.5–17)
EST. AVERAGE GLUCOSE BLD GHB EST-MCNC: 125.5 MG/DL
GFR SERPLBLD CREATININE-BSD FMLA CKD-EPI: >60 ML/MIN/1.73/M2
GLOBULIN SER-MCNC: 2.9 GM/DL (ref 2.4–3.5)
GLUCOSE SERPL-MCNC: 96 MG/DL (ref 74–100)
HBA1C MFR BLD: 6 %
HCT VFR BLD AUTO: 43.9 % (ref 37–47)
HDLC SERPL-MCNC: 50 MG/DL (ref 35–60)
HGB BLD-MCNC: 14.1 G/DL (ref 12–16)
IMM GRANULOCYTES # BLD AUTO: 0.05 X10(3)/MCL (ref 0–0.04)
IMM GRANULOCYTES NFR BLD AUTO: 0.4 %
LDLC SERPL CALC-MCNC: 94 MG/DL (ref 50–140)
LYMPHOCYTES # BLD AUTO: 2.52 X10(3)/MCL (ref 0.6–4.6)
LYMPHOCYTES NFR BLD AUTO: 18.8 %
MCH RBC QN AUTO: 29.1 PG (ref 27–31)
MCHC RBC AUTO-ENTMCNC: 32.1 G/DL (ref 33–36)
MCV RBC AUTO: 90.7 FL (ref 80–94)
MONOCYTES # BLD AUTO: 0.73 X10(3)/MCL (ref 0.1–1.3)
MONOCYTES NFR BLD AUTO: 5.4 %
NEUTROPHILS # BLD AUTO: 9.99 X10(3)/MCL (ref 2.1–9.2)
NEUTROPHILS NFR BLD AUTO: 74.4 %
NRBC BLD AUTO-RTO: 0 %
PLATELET # BLD AUTO: 299 X10(3)/MCL (ref 130–400)
PMV BLD AUTO: 11.6 FL (ref 7.4–10.4)
POTASSIUM SERPL-SCNC: 4.1 MMOL/L (ref 3.5–5.1)
PROT SERPL-MCNC: 6.9 GM/DL (ref 6.4–8.3)
RBC # BLD AUTO: 4.84 X10(6)/MCL (ref 4.2–5.4)
SODIUM SERPL-SCNC: 140 MMOL/L (ref 136–145)
TRIGL SERPL-MCNC: 192 MG/DL (ref 37–140)
VLDLC SERPL CALC-MCNC: 38 MG/DL
WBC # BLD AUTO: 13.43 X10(3)/MCL (ref 4.5–11.5)

## 2025-03-11 PROCEDURE — 83036 HEMOGLOBIN GLYCOSYLATED A1C: CPT | Performed by: GENERAL PRACTICE

## 2025-03-11 PROCEDURE — 36415 COLL VENOUS BLD VENIPUNCTURE: CPT | Performed by: GENERAL PRACTICE

## 2025-03-11 PROCEDURE — 80061 LIPID PANEL: CPT | Performed by: GENERAL PRACTICE

## 2025-03-11 PROCEDURE — 80053 COMPREHEN METABOLIC PANEL: CPT | Performed by: GENERAL PRACTICE

## 2025-03-11 PROCEDURE — 85025 COMPLETE CBC W/AUTO DIFF WBC: CPT | Performed by: GENERAL PRACTICE

## 2025-03-11 RX ORDER — PREDNISONE 5 MG/1
5 TABLET ORAL DAILY
Qty: 30 TABLET | Refills: 5 | Status: SHIPPED | OUTPATIENT
Start: 2025-03-11 | End: 2025-09-07

## 2025-03-17 ENCOUNTER — OFFICE VISIT (OUTPATIENT)
Dept: PRIMARY CARE CLINIC | Facility: CLINIC | Age: 45
End: 2025-03-17
Payer: COMMERCIAL

## 2025-03-17 ENCOUNTER — TELEPHONE (OUTPATIENT)
Dept: PRIMARY CARE CLINIC | Facility: CLINIC | Age: 45
End: 2025-03-17

## 2025-03-17 ENCOUNTER — RESULTS FOLLOW-UP (OUTPATIENT)
Dept: PRIMARY CARE CLINIC | Facility: CLINIC | Age: 45
End: 2025-03-17

## 2025-03-17 DIAGNOSIS — R73.03 PREDIABETES: Primary | ICD-10-CM

## 2025-03-17 DIAGNOSIS — D72.829 LEUKOCYTOSIS, UNSPECIFIED TYPE: ICD-10-CM

## 2025-03-17 NOTE — PROGRESS NOTES
Subjective:       Patient ID: Jane Burger is a 44 y.o. female.    Chief Complaint: No chief complaint on file.    Established patient, presents to the clinic through virtual means to discuss lab testing done at the visit to establish care and for her annual wellness exam which was on 03/11/2025.    Patient had a couple of issues.  First, she has mild leukocytosis with a white blood cell count slightly over 13,000.  No signs or symptoms which would suggest infection, she did have an increase in her neutrophil number.  Other significant abnormalities on her CBC.    Also, her A1c level was 6.0%, no past history of diabetes or prediabetes.    Notably, she did not start her prednisone yet, we are in the process of trying to determine if she has some type of autoimmune condition, she seems to have a some symptoms which might suggest that, chronic joint pain, facial rash, dry mouth.  She also had some rheumatological testing that was positive, but thought to be secondary to Hashimoto's thyroiditis, something that she has had in the past.  It was determined that we would try low-dose prednisone daily, 5 mg daily to determine if she has a an autoimmune condition.  Next visit will be June 2025.    I have reviewed the patient's name and date of birth.  I verbally reviewed the patient's past medical history, active medication list and allergy list.  I will also verify the patient's identity with a picture ID if necessary.  I have verified that the patient is in the state [facility].  The patient has consented to be treated remotely by virtual appointment via [facility] approved interactive audiovisual format.  The patient has access to a working audiovisual format device.    Originating site (whether patient is located): home  Distant site (whether provider is located):  [facility]      Review of Systems   Constitutional: Negative.  Negative for activity change, fatigue, fever and unexpected  weight change.   HENT: Negative.  Negative for hearing loss, rhinorrhea, sore throat and trouble swallowing.    Eyes: Negative.  Negative for discharge, redness and visual disturbance.   Respiratory: Negative.  Negative for chest tightness, shortness of breath and wheezing.    Cardiovascular: Negative.  Negative for chest pain and palpitations.   Gastrointestinal: Negative.  Negative for abdominal pain, blood in stool, constipation, diarrhea, nausea and vomiting.   Endocrine: Negative.  Negative for cold intolerance, heat intolerance, polydipsia and polyuria.   Genitourinary: Negative.  Negative for difficulty urinating, dysuria, hematuria and menstrual problem.   Musculoskeletal: Negative.  Negative for arthralgias, gait problem, joint swelling and neck pain.   Integumentary:  Negative for rash. Negative.   Neurological: Negative.  Negative for dizziness, weakness and headaches.   Psychiatric/Behavioral: Negative.  Negative for agitation, confusion and dysphoric mood.            Patient Care Team:  Darrell Franco MD as PCP - General (Family Medicine)  rAthur Poole MD as Consulting Physician (Obstetrics and Gynecology)  Objective:   Visit Vitals  LMP 02/28/2025 (Approximate)        Physical Exam  Constitutional:       Appearance: Normal appearance.   Psychiatric:         Mood and Affect: Mood normal.         Behavior: Behavior normal.         Thought Content: Thought content normal.           Lab Results   Component Value Date     03/11/2025    K 4.1 03/11/2025     03/11/2025    CO2 25 03/11/2025    BUN 13.2 03/11/2025    CREATININE 0.85 03/11/2025    CALCIUM 9.6 03/11/2025    ALBUMIN 4.0 03/11/2025    BILITOT 0.4 03/11/2025    ALKPHOS 113 03/11/2025    AST 17 03/11/2025    ALT 19 03/11/2025    EGFRNORACEVR >60 03/11/2025     Lab Results   Component Value Date    WBC 13.43 (H) 03/11/2025    RBC 4.84 03/11/2025    HGB 14.1 03/11/2025    HCT 43.9 03/11/2025    MCV 90.7 03/11/2025    RDW 12.6  03/11/2025     03/11/2025      Lab Results   Component Value Date    CHOL 182 03/11/2025    TRIG 192 (H) 03/11/2025    HDL 50 03/11/2025    LDL 94.00 03/11/2025    TOTALCHOLEST 4 03/11/2025     Lab Results   Component Value Date    TSH 3.161 02/19/2025     Lab Results   Component Value Date    HGBA1C 6.0 03/11/2025       Assessment:       ICD-10-CM ICD-9-CM   1. Prediabetes  R73.03 790.29   2. Leukocytosis, unspecified type  D72.829 288.60       Current Outpatient Medications   Medication Instructions    augmented betamethasone dipropionate (DIPROLENE-AF) 0.05 % cream 1 g, Topical (Top), 2 times daily PRN    calcium carb-D3-mag ox-zinc ox (AZRA MAG ZINC PLUS D3) 333 mg-133 unit -133 mg-5 mg Tab 1 tablet, Oral, Daily    cycloSPORINE (RESTASIS) 0.05 % ophthalmic emulsion 1 drop, Both Eyes, 2 times daily    diphenhydrAMINE (BENADRYL) 50 mg, Oral, Nightly PRN    levothyroxine (SYNTHROID) 75 MCG tablet TAKE ONE TABLET BY MOUTH IN THE MORNING before breakfast    predniSONE (DELTASONE) 5 mg, Oral, Daily     Plan:     Problem List Items Addressed This Visit          Oncology    Leukocytosis    Overview   Leukocytosis March 2025, patient is starting prednisone 5 mg daily, we will recheck her CBC before her next visit in June of 2025.         Relevant Orders    CBC Auto Differential       Endocrine    Prediabetes - Primary (Chronic)    Overview   Determined to be prediabetic with an A1c of 6.0% March 2025.  Patient is starting prednisone 5 mg daily, may cause an elevation of her blood sugar, we will recheck her A1c before her next visit in June 2025.         Relevant Orders    Hemoglobin A1C          I spent approximately 25 minutes face-to-face with the patient, over half in discussion of the diagnosis and the importance of compliance with the treatment plan.    Follow-up scheduled June 2025.    This note was created with the assistance of [employer] voice recognition software or phone dictation. There may be transcription  errors as a result of using this technology. Effort has been made to assure accuracy of transcription but any obvious errors or omissions should be clarified with the author of the document.

## 2025-03-17 NOTE — TELEPHONE ENCOUNTER
----- Message from Darrell Franco MD sent at 3/17/2025 11:12 AM CDT -----  Regarding: Abnormal wellness labs  Please schedule patient a lab follow-up visit, virtual if possible.  ----- Message -----  From: Lab, Background User  Sent: 3/11/2025   9:03 PM CDT  To: Darrell Franco MD

## 2025-03-18 ENCOUNTER — TELEPHONE (OUTPATIENT)
Dept: PRIMARY CARE CLINIC | Facility: CLINIC | Age: 45
End: 2025-03-18
Payer: COMMERCIAL

## 2025-03-19 NOTE — TELEPHONE ENCOUNTER
Called pt back and she was notified to stop taking steroid a couple of days before nurse visit due to it affecting her TSH. Pt vocalized understanding and had no questions or concerns. She did state she will ask  a question when she returns for an visit.

## 2025-05-15 DIAGNOSIS — E03.9 HYPOTHYROIDISM, UNSPECIFIED TYPE: ICD-10-CM

## 2025-05-15 NOTE — TELEPHONE ENCOUNTER
Copied from CRM #[id number]. Topic: Medications - Medication Refill  >> May 15, 2025  2:29 PM Blanca wrote:  Who Called: Jane Burger    Refill or New Rx:Refill  RX Name and Strength:  levothyroxine (SYNTHROID) 75 MCG tablet  How is the patient currently taking it? (ex. 1XDay):  Is this a 30 day or 90 day RX:  Local or Mail Order:  List of preferred pharmacies on file (remove unneeded): [id number] - [facility] [place] - [address]    If different Pharmacy is requested, enter Pharmacy information here including location and phone number:    Ordering Provider:      Preferred Method of Contact: Phone Call  Patient's Preferred Phone Number on File: [phone]   Best Call Back Number, if different:  Additional Information:

## 2025-05-16 RX ORDER — LEVOTHYROXINE SODIUM 75 UG/1
75 TABLET ORAL
Qty: 90 TABLET | Refills: 3 | Status: SHIPPED | OUTPATIENT
Start: 2025-05-16 | End: 2026-05-16

## 2025-06-05 ENCOUNTER — CLINICAL SUPPORT (OUTPATIENT)
Dept: PRIMARY CARE CLINIC | Facility: CLINIC | Age: 45
End: 2025-06-05
Payer: COMMERCIAL

## 2025-06-05 DIAGNOSIS — D72.829 LEUKOCYTOSIS, UNSPECIFIED TYPE: ICD-10-CM

## 2025-06-05 DIAGNOSIS — R73.03 PREDIABETES: ICD-10-CM

## 2025-06-05 LAB
BASOPHILS # BLD AUTO: 0.05 X10(3)/MCL
BASOPHILS NFR BLD AUTO: 0.4 %
EOSINOPHIL # BLD AUTO: 0.1 X10(3)/MCL (ref 0–0.9)
EOSINOPHIL NFR BLD AUTO: 0.8 %
ERYTHROCYTE [DISTWIDTH] IN BLOOD BY AUTOMATED COUNT: 13.2 % (ref 11.5–17)
EST. AVERAGE GLUCOSE BLD GHB EST-MCNC: 111.2 MG/DL
HBA1C MFR BLD: 5.5 %
HCT VFR BLD AUTO: 41.9 % (ref 37–47)
HGB BLD-MCNC: 13.5 G/DL (ref 12–16)
IMM GRANULOCYTES # BLD AUTO: 0.06 X10(3)/MCL (ref 0–0.04)
IMM GRANULOCYTES NFR BLD AUTO: 0.5 %
LYMPHOCYTES # BLD AUTO: 3.38 X10(3)/MCL (ref 0.6–4.6)
LYMPHOCYTES NFR BLD AUTO: 27.9 %
MCH RBC QN AUTO: 29.3 PG (ref 27–31)
MCHC RBC AUTO-ENTMCNC: 32.2 G/DL (ref 33–36)
MCV RBC AUTO: 90.9 FL (ref 80–94)
MONOCYTES # BLD AUTO: 0.64 X10(3)/MCL (ref 0.1–1.3)
MONOCYTES NFR BLD AUTO: 5.3 %
NEUTROPHILS # BLD AUTO: 7.87 X10(3)/MCL (ref 2.1–9.2)
NEUTROPHILS NFR BLD AUTO: 65.1 %
NRBC BLD AUTO-RTO: 0 %
PLATELET # BLD AUTO: 278 X10(3)/MCL (ref 130–400)
PMV BLD AUTO: 11.9 FL (ref 7.4–10.4)
RBC # BLD AUTO: 4.61 X10(6)/MCL (ref 4.2–5.4)
WBC # BLD AUTO: 12.1 X10(3)/MCL (ref 4.5–11.5)

## 2025-06-05 PROCEDURE — 85025 COMPLETE CBC W/AUTO DIFF WBC: CPT | Performed by: GENERAL PRACTICE

## 2025-06-05 PROCEDURE — 83036 HEMOGLOBIN GLYCOSYLATED A1C: CPT | Performed by: GENERAL PRACTICE

## 2025-06-06 ENCOUNTER — RESULTS FOLLOW-UP (OUTPATIENT)
Dept: PRIMARY CARE CLINIC | Facility: CLINIC | Age: 45
End: 2025-06-06

## 2025-06-10 NOTE — PROGRESS NOTES
Subjective:       Patient ID: Jane Burger is a 44 y.o. female.    Chief Complaint:  Follow-up, positive KAILEY, polyarthropathy, facial rash    From previous visit, March 2025:    Patient does have some clinical issues, something that would seem to be clinical symptoms that would suggest some type of autoimmune phenomenon.  She has facial rash, migratory joint pain.  She has received a workup, she does have a diagnosis of Hashimoto's thyroiditis.  She did have some abnormal rheumatological testing, positive KAILEY, elevated sed rate.    Component  Ref Range & Units (hover) 1 yr ago  KAILEY Pattern Homogeneous Abnormal   KAILEY Titer 1:640 Abnormal     She was prescribed low-dose prednisone 5 mg daily.  Today, she does report that the prednisone helps significantly.  There are some other factors in her life, and understanding the risks of long-term corticosteroid use she would like to see if she can modify the dose, possibly take it less often, or a smaller dose.  No new problems today.  She does report that the facial rash has improved, the chronic episodic fatigue has improved along with the widespread joint pain.      Review of Systems   Constitutional: Negative.  Negative for fatigue and fever.   HENT: Negative.  Negative for sore throat and trouble swallowing.    Eyes: Negative.  Negative for redness and visual disturbance.   Respiratory: Negative.  Negative for chest tightness and shortness of breath.    Cardiovascular: Negative.  Negative for chest pain.   Gastrointestinal: Negative.  Negative for abdominal pain, blood in stool and nausea.   Endocrine: Negative.  Negative for cold intolerance, heat intolerance and polyuria.   Genitourinary: Negative.    Musculoskeletal: Negative.  Negative for arthralgias, gait problem and joint swelling.   Integumentary:  Negative for rash. Negative.   Neurological: Negative.  Negative for dizziness, weakness and headaches.   Psychiatric/Behavioral: Negative.  Negative for  "agitation and dysphoric mood.            Patient Care Team:  Darrell Franco MD as PCP - General (Family Medicine)  Arthur Poole MD as Consulting Physician (Obstetrics and Gynecology)  Objective:   Visit Vitals  /88   Pulse 76   Ht 5' 5" (1.651 m)   Wt 76.7 kg (169 lb)   BMI 28.12 kg/m²        Physical Exam  Constitutional:       Appearance: Normal appearance.   HENT:      Head: Normocephalic.      Mouth/Throat:      Mouth: Mucous membranes are moist.      Pharynx: Oropharynx is clear.   Eyes:      Conjunctiva/sclera: Conjunctivae normal.      Pupils: Pupils are equal, round, and reactive to light.   Cardiovascular:      Rate and Rhythm: Normal rate and regular rhythm.      Heart sounds: Normal heart sounds.   Pulmonary:      Effort: Pulmonary effort is normal.      Breath sounds: Normal breath sounds.   Abdominal:      General: Abdomen is flat.      Palpations: Abdomen is soft.   Musculoskeletal:         General: Normal range of motion.      Cervical back: Neck supple.   Skin:     General: Skin is warm and dry.   Neurological:      General: No focal deficit present.      Mental Status: She is alert and oriented to person, place, and time.   Psychiatric:         Mood and Affect: Mood normal.         Behavior: Behavior normal.         Thought Content: Thought content normal.         Judgment: Judgment normal.           Lab Results   Component Value Date    GLU 96 03/11/2025     03/11/2025    K 4.1 03/11/2025     03/11/2025    CO2 25 03/11/2025    BUN 13.2 03/11/2025    CREATININE 0.85 03/11/2025    CALCIUM 9.6 03/11/2025    PROT 6.9 03/11/2025    ALBUMIN 4.0 03/11/2025    BILITOT 0.4 03/11/2025    ALKPHOS 113 03/11/2025    AST 17 03/11/2025    ALT 19 03/11/2025    EGFRNORACEVR >60 03/11/2025     Lab Results   Component Value Date    WBC 12.10 (H) 06/05/2025    RBC 4.61 06/05/2025    HGB 13.5 06/05/2025    HCT 41.9 06/05/2025    MCV 90.9 06/05/2025    RDW 13.2 06/05/2025     06/05/2025 " "     Lab Results   Component Value Date    CHOL 182 03/11/2025    TRIG 192 (H) 03/11/2025    HDL 50 03/11/2025    LDL 94.00 03/11/2025    TOTALCHOLEST 4 03/11/2025     Lab Results   Component Value Date    TSH 3.161 02/19/2025     Lab Results   Component Value Date    HGBA1C 5.5 06/05/2025        No results found for: "PSA"      Assessment:       ICD-10-CM ICD-9-CM   1. Elevated antinuclear antibody (KAILEY) level  R76.8 795.79   2. Inflammatory polyarthropathy of multiple sites  M06.4 714.9       Current Outpatient Medications   Medication Instructions    augmented betamethasone dipropionate (DIPROLENE-AF) 0.05 % cream 1 g, 2 times daily PRN    calcium carb-D3-mag ox-zinc ox (AZRA MAG ZINC PLUS D3) 333 mg-133 unit -133 mg-5 mg Tab 1 tablet, Daily    cycloSPORINE (RESTASIS) 0.05 % ophthalmic emulsion 1 drop, 2 times daily    diphenhydrAMINE (BENADRYL) 50 mg, Nightly PRN    levothyroxine (SYNTHROID) 75 mcg, Oral, Before breakfast    predniSONE (DELTASONE) 5 mg, Oral, Daily     Plan:     Problem List Items Addressed This Visit          Immunology/Multi System    Elevated antinuclear antibody (KAILEY) level - Primary (Chronic)    Overview   06/12/2025:  Patient appears to be doing well on the low-dose prednisone.  We discussed the risks versus benefits of regular corticosteroid use, the patient will modify her dosing, we will see if she can get off of it, or if she can take less of it.  We will re-evaluate in approximately four months.    03/17/2025:  While it is possible that this is secondary to her Hashimoto's, other causes can not be completely ruled out.  After a lengthy discussion, it was mutually decided that we would try a low-dose prednisone.  Risk factors concerning long-term use of corticosteroids were discussed.  Would like to see her back in three months for continuation of this discussion, and possibly a change in treatment plans depending on the effectiveness of the prednisone.         Inflammatory " polyarthropathy of multiple sites (Chronic)    Overview   As diagnosed early 2025, the patient has symptoms that would strongly suggestive of an inflammatory polyarthropathy with a positive KAILEY.  She has experienced improvement with low-dose daily prednisone.  We have not found a definitive treatment plan, certainly she does understand the risks of being on long-term corticosteroid medication.  We will address those risks if she continues to be on this medication, for now she will see if she can take less of it, or take it less often.  I will see her back in about four months for re-evaluation.                    Follow up in about 4 months (around 10/22/2025) for Chronic condition F/up Virtual.    This note was created with the assistance of [employer] voice recognition software or phone dictation. There may be transcription errors as a result of using this technology. Effort has been made to assure accuracy of transcription but any obvious errors or omissions should be clarified with the author of the document.

## 2025-06-12 ENCOUNTER — OFFICE VISIT (OUTPATIENT)
Dept: PRIMARY CARE CLINIC | Facility: CLINIC | Age: 45
End: 2025-06-12
Payer: COMMERCIAL

## 2025-06-12 VITALS
HEART RATE: 76 BPM | SYSTOLIC BLOOD PRESSURE: 128 MMHG | HEIGHT: 65 IN | DIASTOLIC BLOOD PRESSURE: 88 MMHG | WEIGHT: 169 LBS | BODY MASS INDEX: 28.16 KG/M2

## 2025-06-12 DIAGNOSIS — M06.4 INFLAMMATORY POLYARTHROPATHY OF MULTIPLE SITES: Chronic | ICD-10-CM

## 2025-06-12 DIAGNOSIS — R76.8 ELEVATED ANTINUCLEAR ANTIBODY (ANA) LEVEL: Primary | Chronic | ICD-10-CM
